# Patient Record
Sex: MALE | Race: WHITE | NOT HISPANIC OR LATINO
[De-identification: names, ages, dates, MRNs, and addresses within clinical notes are randomized per-mention and may not be internally consistent; named-entity substitution may affect disease eponyms.]

---

## 2019-03-25 PROBLEM — Z00.00 ENCOUNTER FOR PREVENTIVE HEALTH EXAMINATION: Status: ACTIVE | Noted: 2019-03-25

## 2019-04-25 ENCOUNTER — APPOINTMENT (OUTPATIENT)
Dept: UROLOGY | Facility: CLINIC | Age: 64
End: 2019-04-25
Payer: MEDICARE

## 2019-04-25 DIAGNOSIS — E78.00 PURE HYPERCHOLESTEROLEMIA, UNSPECIFIED: ICD-10-CM

## 2019-04-25 DIAGNOSIS — Z78.9 OTHER SPECIFIED HEALTH STATUS: ICD-10-CM

## 2019-04-25 DIAGNOSIS — R31.29 OTHER MICROSCOPIC HEMATURIA: ICD-10-CM

## 2019-04-25 PROCEDURE — 99203 OFFICE O/P NEW LOW 30 MIN: CPT

## 2019-04-25 NOTE — ASSESSMENT
[FreeTextEntry1] : SALMA ALDANA is a 64 year old male former smoker who presents with microscopic hematuria\par We discussed AUA microscopic hematuria guidelines w the patient, and work up including cystoscopy and CT is warranted only with 3 or greater of blood cells.\par Plan for repeat urinalysis with urine cytology\par If he does indeed have true microheme we will plan for work up.

## 2019-04-25 NOTE — PHYSICAL EXAM
[General Appearance - Well Developed] : well developed [General Appearance - Well Nourished] : well nourished [Normal Appearance] : normal appearance [Well Groomed] : well groomed [General Appearance - In No Acute Distress] : no acute distress [Edema] : no peripheral edema [Respiration, Rhythm And Depth] : normal respiratory rhythm and effort [Exaggerated Use Of Accessory Muscles For Inspiration] : no accessory muscle use [Abdomen Soft] : soft [Abdomen Tenderness] : non-tender [Costovertebral Angle Tenderness] : no ~M costovertebral angle tenderness [Urethral Meatus] : meatus normal [Urinary Bladder Findings] : the bladder was normal on palpation [Scrotum] : the scrotum was normal [Testes Mass (___cm)] : there were no testicular masses [No Prostate Nodules] : no prostate nodules [Prostate Size ___ gm] : prostate size [unfilled] gm [Normal Station and Gait] : the gait and station were normal for the patient's age [] : no rash [Oriented To Time, Place, And Person] : oriented to person, place, and time [Affect] : the affect was normal [Mood] : the mood was normal [Not Anxious] : not anxious [No Palpable Adenopathy] : no palpable adenopathy

## 2019-04-25 NOTE — LETTER HEADER
[FreeTextEntry3] : Bienvenido Spencer MD\par [facility]imally [facility]vasive Surgery\par \par [facility]\par Division of Urology\par [address]\par [address]\[place] [address]\par Tel [phone]\par Fax [phone]\par \par

## 2019-04-25 NOTE — HISTORY OF PRESENT ILLNESS
[FreeTextEntry1] : SALMA ALDANA is a 64 year old male former smoker who presents with questionable microscopic hematuria. Patient had urine testing done his primary care physician which showed small blood however the rolling 2 red blood cells. There is no pyuria and PSA was 2.09.\par Old records reviewed and PSA is 1.39 January 2017.\par Denies LUTS.\par Denies gross hematuria, dysuria or associated symptoms. \par He does have a remote history of metastatic melanoma which was secured with surgical resection and chemotherapy.\par Denies  PMH including kidney stones, recurrent UTIs. \par Family History: No  malignancies\par \par

## 2019-04-25 NOTE — LETTER BODY
[Dear  ___] : Dear  [unfilled], [Consult Letter:] : I had the pleasure of evaluating your patient, [unfilled]. [Please see my note below.] : Please see my note below. [Consult Closing:] : Thank you very much for allowing me to participate in the care of this patient.  If you have any questions, please do not hesitate to contact me. [Sincerely,] : Sincerely, [FreeTextEntry2] : Callie Nguyen M.D.\par [address] [address]\par  [FreeTextEntry3] : Bienvenido Spencer MD\par [facility] Urolog[facility]imally Invasive Surgery\par [facility]\par Division of Urology\par

## 2019-06-24 ENCOUNTER — INPATIENT (INPATIENT)
Facility: HOSPITAL | Age: 64
LOS: 1 days | Discharge: HOME HEALTH | End: 2019-06-26
Attending: HOSPITALIST | Admitting: HOSPITALIST
Payer: MEDICARE

## 2019-06-24 VITALS
DIASTOLIC BLOOD PRESSURE: 69 MMHG | TEMPERATURE: 99 F | HEART RATE: 96 BPM | RESPIRATION RATE: 20 BRPM | OXYGEN SATURATION: 93 % | SYSTOLIC BLOOD PRESSURE: 130 MMHG

## 2019-06-24 LAB
ANION GAP SERPL CALC-SCNC: 16 MMOL/L — HIGH (ref 7–14)
BASOPHILS # BLD AUTO: 0.03 K/UL — SIGNIFICANT CHANGE UP (ref 0–0.2)
BASOPHILS NFR BLD AUTO: 0.3 % — SIGNIFICANT CHANGE UP (ref 0–1)
BUN SERPL-MCNC: 21 MG/DL — HIGH (ref 10–20)
CALCIUM SERPL-MCNC: 9.3 MG/DL — SIGNIFICANT CHANGE UP (ref 8.5–10.1)
CHLORIDE SERPL-SCNC: 99 MMOL/L — SIGNIFICANT CHANGE UP (ref 98–110)
CO2 SERPL-SCNC: 24 MMOL/L — SIGNIFICANT CHANGE UP (ref 17–32)
CREAT SERPL-MCNC: 0.8 MG/DL — SIGNIFICANT CHANGE UP (ref 0.7–1.5)
EOSINOPHIL # BLD AUTO: 0.03 K/UL — SIGNIFICANT CHANGE UP (ref 0–0.7)
EOSINOPHIL NFR BLD AUTO: 0.3 % — SIGNIFICANT CHANGE UP (ref 0–8)
GLUCOSE SERPL-MCNC: 114 MG/DL — HIGH (ref 70–99)
HCT VFR BLD CALC: 38.9 % — LOW (ref 42–52)
HGB BLD-MCNC: 13.5 G/DL — LOW (ref 14–18)
IMM GRANULOCYTES NFR BLD AUTO: 0.4 % — HIGH (ref 0.1–0.3)
INR BLD: 1.11 RATIO — SIGNIFICANT CHANGE UP (ref 0.65–1.3)
LACTATE SERPL-SCNC: 0.8 MMOL/L — SIGNIFICANT CHANGE UP (ref 0.5–2.2)
LYMPHOCYTES # BLD AUTO: 0.96 K/UL — LOW (ref 1.2–3.4)
LYMPHOCYTES # BLD AUTO: 8.9 % — LOW (ref 20.5–51.1)
MCHC RBC-ENTMCNC: 32.8 PG — HIGH (ref 27–31)
MCHC RBC-ENTMCNC: 34.7 G/DL — SIGNIFICANT CHANGE UP (ref 32–37)
MCV RBC AUTO: 94.4 FL — HIGH (ref 80–94)
MONOCYTES # BLD AUTO: 1.03 K/UL — HIGH (ref 0.1–0.6)
MONOCYTES NFR BLD AUTO: 9.5 % — HIGH (ref 1.7–9.3)
NEUTROPHILS # BLD AUTO: 8.74 K/UL — HIGH (ref 1.4–6.5)
NEUTROPHILS NFR BLD AUTO: 80.6 % — HIGH (ref 42.2–75.2)
NRBC # BLD: 0 /100 WBCS — SIGNIFICANT CHANGE UP (ref 0–0)
PLATELET # BLD AUTO: 141 K/UL — SIGNIFICANT CHANGE UP (ref 130–400)
POTASSIUM SERPL-MCNC: 3.9 MMOL/L — SIGNIFICANT CHANGE UP (ref 3.5–5)
POTASSIUM SERPL-SCNC: 3.9 MMOL/L — SIGNIFICANT CHANGE UP (ref 3.5–5)
PROTHROM AB SERPL-ACNC: 12.7 SEC — SIGNIFICANT CHANGE UP (ref 9.95–12.87)
RBC # BLD: 4.12 M/UL — LOW (ref 4.7–6.1)
RBC # FLD: 13.3 % — SIGNIFICANT CHANGE UP (ref 11.5–14.5)
SODIUM SERPL-SCNC: 139 MMOL/L — SIGNIFICANT CHANGE UP (ref 135–146)
WBC # BLD: 10.83 K/UL — HIGH (ref 4.8–10.8)
WBC # FLD AUTO: 10.83 K/UL — HIGH (ref 4.8–10.8)

## 2019-06-24 PROCEDURE — 74177 CT ABD & PELVIS W/CONTRAST: CPT | Mod: 26

## 2019-06-24 PROCEDURE — 71260 CT THORAX DX C+: CPT | Mod: 26

## 2019-06-24 PROCEDURE — 93010 ELECTROCARDIOGRAM REPORT: CPT

## 2019-06-24 PROCEDURE — 70450 CT HEAD/BRAIN W/O DYE: CPT | Mod: 26

## 2019-06-24 PROCEDURE — 99285 EMERGENCY DEPT VISIT HI MDM: CPT

## 2019-06-24 RX ORDER — AZITHROMYCIN 500 MG/1
500 TABLET, FILM COATED ORAL EVERY 24 HOURS
Refills: 0 | Status: DISCONTINUED | OUTPATIENT
Start: 2019-06-25 | End: 2019-06-25

## 2019-06-24 RX ORDER — CHLORHEXIDINE GLUCONATE 213 G/1000ML
1 SOLUTION TOPICAL
Refills: 0 | Status: DISCONTINUED | OUTPATIENT
Start: 2019-06-24 | End: 2019-06-26

## 2019-06-24 RX ORDER — CEFTRIAXONE 500 MG/1
INJECTION, POWDER, FOR SOLUTION INTRAMUSCULAR; INTRAVENOUS
Refills: 0 | Status: DISCONTINUED | OUTPATIENT
Start: 2019-06-24 | End: 2019-06-26

## 2019-06-24 RX ORDER — SIMVASTATIN 20 MG/1
1 TABLET, FILM COATED ORAL
Qty: 0 | Refills: 0 | DISCHARGE

## 2019-06-24 RX ORDER — MORPHINE SULFATE 50 MG/1
4 CAPSULE, EXTENDED RELEASE ORAL THREE TIMES A DAY
Refills: 0 | Status: DISCONTINUED | OUTPATIENT
Start: 2019-06-24 | End: 2019-06-26

## 2019-06-24 RX ORDER — ENOXAPARIN SODIUM 100 MG/ML
40 INJECTION SUBCUTANEOUS DAILY
Refills: 0 | Status: DISCONTINUED | OUTPATIENT
Start: 2019-06-24 | End: 2019-06-26

## 2019-06-24 RX ORDER — AZITHROMYCIN 500 MG/1
500 TABLET, FILM COATED ORAL ONCE
Refills: 0 | Status: COMPLETED | OUTPATIENT
Start: 2019-06-24 | End: 2019-06-24

## 2019-06-24 RX ORDER — MORPHINE SULFATE 50 MG/1
2 CAPSULE, EXTENDED RELEASE ORAL EVERY 4 HOURS
Refills: 0 | Status: DISCONTINUED | OUTPATIENT
Start: 2019-06-24 | End: 2019-06-24

## 2019-06-24 RX ORDER — PANTOPRAZOLE SODIUM 20 MG/1
40 TABLET, DELAYED RELEASE ORAL
Refills: 0 | Status: DISCONTINUED | OUTPATIENT
Start: 2019-06-24 | End: 2019-06-26

## 2019-06-24 RX ORDER — CEFTRIAXONE 500 MG/1
1000 INJECTION, POWDER, FOR SOLUTION INTRAMUSCULAR; INTRAVENOUS EVERY 24 HOURS
Refills: 0 | Status: DISCONTINUED | OUTPATIENT
Start: 2019-06-25 | End: 2019-06-26

## 2019-06-24 RX ORDER — ACETAMINOPHEN 500 MG
650 TABLET ORAL EVERY 6 HOURS
Refills: 0 | Status: DISCONTINUED | OUTPATIENT
Start: 2019-06-24 | End: 2019-06-26

## 2019-06-24 RX ORDER — CEFTRIAXONE 500 MG/1
1000 INJECTION, POWDER, FOR SOLUTION INTRAMUSCULAR; INTRAVENOUS ONCE
Refills: 0 | Status: COMPLETED | OUTPATIENT
Start: 2019-06-24 | End: 2019-06-24

## 2019-06-24 RX ORDER — AZITHROMYCIN 500 MG/1
TABLET, FILM COATED ORAL
Refills: 0 | Status: DISCONTINUED | OUTPATIENT
Start: 2019-06-24 | End: 2019-06-25

## 2019-06-24 RX ORDER — OXYCODONE AND ACETAMINOPHEN 5; 325 MG/1; MG/1
1 TABLET ORAL EVERY 6 HOURS
Refills: 0 | Status: DISCONTINUED | OUTPATIENT
Start: 2019-06-24 | End: 2019-06-26

## 2019-06-24 RX ORDER — SIMVASTATIN 20 MG/1
40 TABLET, FILM COATED ORAL AT BEDTIME
Refills: 0 | Status: DISCONTINUED | OUTPATIENT
Start: 2019-06-24 | End: 2019-06-26

## 2019-06-24 RX ADMIN — AZITHROMYCIN 255 MILLIGRAM(S): 500 TABLET, FILM COATED ORAL at 13:16

## 2019-06-24 RX ADMIN — SIMVASTATIN 40 MILLIGRAM(S): 20 TABLET, FILM COATED ORAL at 22:38

## 2019-06-24 RX ADMIN — CEFTRIAXONE 100 MILLIGRAM(S): 500 INJECTION, POWDER, FOR SOLUTION INTRAMUSCULAR; INTRAVENOUS at 22:38

## 2019-06-24 RX ADMIN — CEFTRIAXONE 100 MILLIGRAM(S): 500 INJECTION, POWDER, FOR SOLUTION INTRAMUSCULAR; INTRAVENOUS at 12:43

## 2019-06-24 NOTE — ED ADULT NURSE NOTE - NSIMPLEMENTINTERV_GEN_ALL_ED
Implemented All Fall Risk Interventions:  [place] to call system. Call bell, personal items and telephone within reach. Instruct patient to call for assistance. Room bathroom lighting operational. Non-slip footwear when patient is off stretcher. Physically safe environment: no spills, clutter or unnecessary equipment. Stretcher in lowest position, wheels locked, appropriate side rails in place. Provide visual cue, wrist band, yellow gown, etc. Monitor gait and stability. Monitor for mental status changes and reorient to person, place, and time. Review medications for side effects contributing to fall risk. Reinforce activity limits and safety measures with patient and family.

## 2019-06-24 NOTE — ED ADULT NURSE NOTE - CHPI ED NUR SYMPTOMS NEG
no tingling/no numbness/no weakness/no confusion/no vomiting/no bleeding/no deformity/no fever/no loss of consciousness

## 2019-06-24 NOTE — ED PROVIDER NOTE - ATTENDING CONTRIBUTION TO CARE
Seen with PA agree with  above, lungs- rhonchi to both lower bases, abdomen- soft no tenderness to any region, neuro- non focal, CT chest reveal a pneumonia, will need admission for IV antibiotics and further treaments, stable for regular floor admission

## 2019-06-24 NOTE — H&P ADULT - NSHPPHYSICALEXAM_GEN_ALL_CORE
PHYSICAL EXAM:  GEN: No acute distress  HEENT: NCAT   LUNGS: +Rhonchi with left posterior rib tenderness  HEART: S1/S2 present. RRR.   ABD: Soft, non-tender, non-distended. Bowel sounds present  EXT: No pitting edema  NEURO: AAOX3 PHYSICAL EXAM:  GEN: No acute distress  HEENT: NCAT   LUNGS: +Rhonchi on R lower lung, with left posterior rib tenderness  HEART: S1/S2 present. RRR.   ABD: Soft, non-tender, non-distended. Bowel sounds present  EXT: No pitting edema  NEURO: AAOX3

## 2019-06-24 NOTE — H&P ADULT - ASSESSMENT
65 yo M with PMHx of dyslipidemia presents after staining back injury 2/2 mechanical fall. He had slipped on a wet floor in the hotel bathroom on 06/21, hitting both his left lower back and ribs on the toilet. Found to have multifocal pneumonia.    #) Shortness of breath/cough likely 2/2 multifocal pneumonia, possibly 2/2 aspiration   - Lactate 0.8  - Follow-up ID   - Follow-up MRSA PCR, BCx     #) Acute L posterior 10th/11th rib fracture   - Immobilization  - Pain Control: Tylenol 650 mg QD     #) GI ppx: PO Protonix 40 mg QD  #) DVT ppx: Lovenox 40 mg QD     #) Activity: Ambulate as tolerated, fall risk  #) Diet: Regular, aspiration precaution    #) Dispo: From Home  #) Full Code 63 yo M with PMHx of dyslipidemia presents after staining back injury 2/2 mechanical fall. He had slipped on a wet floor in the hotel bathroom on 06/21, hitting both his left lower back and ribs on the toilet. Found to have multifocal pneumonia.    #) Shortness of breath/cough likely 2/2 multifocal pneumonia, ?aspiration  - Lactate 0.8, CT scan showing RML/RLL pneumonia with aspirate in the bronchus. Afebrile so far, denied. No MRSA risk factors; denied aspiration/dysphagia/choking on food   - Start on IV Rocephin 1 g QD and Azithromycin 500 mg QD  - Will need outpatient Pulmonary follow-up     #) Acute L posterior 10th/11th rib fracture   - Immobilization, incentive spirometry   - Pain Control: Tylenol 650 mg QD PRN Mild, Percocet PRN Moderate Q6H, IV Morphine 4 mg PRN Q8H Severe     #) DLD   - Continue with Simvastatin 40 mg QHS     #) GI ppx: PO Protonix 40 mg QD  #) DVT ppx: Lovenox 40 mg QD     #) Activity: Ambulate as tolerated, fall risk  #) Diet: Regular, aspiration precaution, HOB 35-45    #) Dispo: From Home  #) Full Code    #) Pharmacy: [facility]

## 2019-06-24 NOTE — ED PROVIDER NOTE - PROGRESS NOTE DETAILS
Case discussed with Surgery resident 2 rib fractures- states can be admitted to medicine for pneumonia.

## 2019-06-24 NOTE — H&P ADULT - NSHPLABSRESULTS_GEN_ALL_CORE
LABS:                        13.5   10.83 )-----------( 141      ( 24 Jun 2019 09:23 )             38.9     06-24    139  |  99  |  21<H>  ----------------------------<  114<H>  3.9   |  24  |  0.8    Ca    9.3      24 Jun 2019 09:23      PT/INR - ( 24 Jun 2019 09:23 )   PT: 12.70 sec;   INR: 1.11 ratio               Lactate, Blood: 0.8 mmol/L (06-24-19 @ 12:07)          Mild compression deformity at L4 which is likely chronic in nature.   Clinical correlation is advised.    Multifocal airspace opacification the right middle lobe and right lower   lobe. Debris in bronchus intermedius and right lower lobe bronchus   consistent with aspirated debris. Findings are consistent with aspiration   pneumonia in the appropriate clinical context.    < end of copied text >    < from: CT Abdomen and Pelvis w/ IV Cont (06.24.19 @ 11:08) >    there are nondisplaced acute left posterior 10th and   11th rib fractures.    < end of copied text >

## 2019-06-24 NOTE — ED PROVIDER NOTE - CARE PLAN
Principal Discharge DX:	Infiltrate of lung present on computed tomography  Secondary Diagnosis:	Closed fracture of multiple ribs of left side, initial encounter  Secondary Diagnosis:	Hypoxia

## 2019-06-24 NOTE — ED PROVIDER NOTE - OBJECTIVE STATEMENT
63 y/o male with hx HDL presents to the ED c/o " I slipped on wet floor in hotel bathroom on Friday hitting my left lower back/ ribs on toliet. I have pain with breathing." no head trauma/ LOC/ neck pain 65 y/o male with hx HDL presents to the ED c/o " I slipped on wet floor in hotel bathroom on Friday hitting my left lower back/ ribs on toliet. I have pain with breathing." no head trauma/ LOC/ neck pain. Patient also c/o productive cough and chills for a couple days. Wife with similar symptoms.

## 2019-06-24 NOTE — H&P ADULT - HISTORY OF PRESENT ILLNESS
63 yo M with PMHx of dyslipidemia presents after staining back injury 2/2 mechanical fall. He had slipped on a wet floor in the hotel bathroom on 06/21, hitting both his left lower back and ribs on the toilet. He has pain with breathing. Denied head trauma, loss of consciousness or neck pain. He also complained of productive cough and chills for the past couple days. His wife also has similar symptoms.     EKG was showing normal sinus rhythm. Trauma work-up yielded closed fracture of 10th and 11th left-sided ribs with RML/RLL pulmonary infiltrate with findings consistent with aspiration. 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 He had slipped on a wet floor in the hotel bathroom on 06/21, hitting both his left lower back and ribs on the toilet. He has pain with breathing. Denied head trauma, loss of consciousness or neck pain. He also complained of productive cough of clear sputum and chills for 3 days. His wife also has similar symptoms. Denied choking/dysphagia/coughing with food/recent use of antibiotics.     EKG was showing normal sinus rhythm. Trauma work-up yielded closed fracture of 10th and 11th left-sided ribs with RML/RLL pulmonary infiltrate with findings consistent with aspiration. 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 He had slipped on a wet floor in the hotel bathroom on 06/21, hitting both his left lower back and ribs on the toilet. He denied feeling weak or lightheaded prior to fall. He has pain with breathing. Denied head trauma, loss of consciousness or neck pain. He also complained of productive cough of clear sputum and chills for 3 days. His wife also has similar symptoms. Denied choking/dysphagia/coughing with food/recent use of antibiotics.     EKG was showing normal sinus rhythm. Trauma work-up yielded closed fracture of 10th and 11th left-sided ribs with RML/RLL pulmonary infiltrate with findings consistent with aspiration.

## 2019-06-24 NOTE — H&P ADULT - ATTENDING COMMENTS
63 yo M with PMHx of dyslipidemia presents after sustaining back injury 2/2 mechanical fall. He had slipped on a wet floor in the hotel bathroom on 06/21, hitting both his left lower back and ribs on the toilet. Found to have RLL/RML pna    #) Shortness of breath/cough 2/2 RLL/RML aspiration PNA  - Lactate 0.8, CT scan showing RML/RLL pneumonia with aspirate in the bronchus. Afebrile so far, denied. No MRSA risk factors; denied aspiration/dysphagia/choking on food   - Start on IV Rocephin 1 g QD and Azithromycin 500 mg QD  -Change to CTX, flagyl.  Overall improving.  ST eval ruled out any undiagnosed aspiration.  May be discharged tomorrow on PO Antibiotics.    #) Acute L posterior 10th/11th rib fracture   - Immobilization, incentive spirometry   - Pain Control: Tylenol 650 mg QD PRN Mild, Percocet PRN Moderate Q6H, IV Morphine 4 mg PRN Q8H Severe   No neurocardiogenic etiology for his fall. PT cleared for home.     #) DLD   - Continue with Simvastatin 40 mg QHS     #) GI ppx: PO Protonix 40 mg QD  #) DVT ppx: Lovenox 40 mg QD     #) Activity: Ambulate as tolerated, fall risk  #) Diet: Regular, aspiration precaution, HOB 35-45    #) Dispo: From Home  #) Full Code    #) [facility]acy: [facility]     ANTICIPATE FOR D/c 6/26 ON PO Antibiotics

## 2019-06-24 NOTE — ED ADULT NURSE NOTE - OBJECTIVE STATEMENT
pt s/p fall at home on wet floor. denies head trauma, loc. no blood thinners. c/o left rib cage pain.

## 2019-06-25 LAB
ANION GAP SERPL CALC-SCNC: 13 MMOL/L — SIGNIFICANT CHANGE UP (ref 7–14)
BASOPHILS # BLD AUTO: 0.02 K/UL — SIGNIFICANT CHANGE UP (ref 0–0.2)
BASOPHILS NFR BLD AUTO: 0.2 % — SIGNIFICANT CHANGE UP (ref 0–1)
BUN SERPL-MCNC: 17 MG/DL — SIGNIFICANT CHANGE UP (ref 10–20)
CALCIUM SERPL-MCNC: 8.3 MG/DL — LOW (ref 8.5–10.1)
CHLORIDE SERPL-SCNC: 99 MMOL/L — SIGNIFICANT CHANGE UP (ref 98–110)
CK SERPL-CCNC: 66 U/L — SIGNIFICANT CHANGE UP (ref 0–225)
CO2 SERPL-SCNC: 26 MMOL/L — SIGNIFICANT CHANGE UP (ref 17–32)
CREAT SERPL-MCNC: 0.7 MG/DL — SIGNIFICANT CHANGE UP (ref 0.7–1.5)
EOSINOPHIL # BLD AUTO: 0.14 K/UL — SIGNIFICANT CHANGE UP (ref 0–0.7)
EOSINOPHIL NFR BLD AUTO: 1.4 % — SIGNIFICANT CHANGE UP (ref 0–8)
GLUCOSE SERPL-MCNC: 97 MG/DL — SIGNIFICANT CHANGE UP (ref 70–99)
HCT VFR BLD CALC: 37.4 % — LOW (ref 42–52)
HCV AB S/CO SERPL IA: 0.38 S/CO — SIGNIFICANT CHANGE UP (ref 0–0.99)
HCV AB SERPL-IMP: SIGNIFICANT CHANGE UP
HGB BLD-MCNC: 12.6 G/DL — LOW (ref 14–18)
IMM GRANULOCYTES NFR BLD AUTO: 0.3 % — SIGNIFICANT CHANGE UP (ref 0.1–0.3)
LYMPHOCYTES # BLD AUTO: 1.15 K/UL — LOW (ref 1.2–3.4)
LYMPHOCYTES # BLD AUTO: 11.5 % — LOW (ref 20.5–51.1)
MCHC RBC-ENTMCNC: 32.1 PG — HIGH (ref 27–31)
MCHC RBC-ENTMCNC: 33.7 G/DL — SIGNIFICANT CHANGE UP (ref 32–37)
MCV RBC AUTO: 95.4 FL — HIGH (ref 80–94)
MONOCYTES # BLD AUTO: 0.98 K/UL — HIGH (ref 0.1–0.6)
MONOCYTES NFR BLD AUTO: 9.8 % — HIGH (ref 1.7–9.3)
NEUTROPHILS # BLD AUTO: 7.7 K/UL — HIGH (ref 1.4–6.5)
NEUTROPHILS NFR BLD AUTO: 76.8 % — HIGH (ref 42.2–75.2)
NRBC # BLD: 0 /100 WBCS — SIGNIFICANT CHANGE UP (ref 0–0)
PLATELET # BLD AUTO: 139 K/UL — SIGNIFICANT CHANGE UP (ref 130–400)
POTASSIUM SERPL-MCNC: 4 MMOL/L — SIGNIFICANT CHANGE UP (ref 3.5–5)
POTASSIUM SERPL-SCNC: 4 MMOL/L — SIGNIFICANT CHANGE UP (ref 3.5–5)
RBC # BLD: 3.92 M/UL — LOW (ref 4.7–6.1)
RBC # FLD: 13.4 % — SIGNIFICANT CHANGE UP (ref 11.5–14.5)
SODIUM SERPL-SCNC: 138 MMOL/L — SIGNIFICANT CHANGE UP (ref 135–146)
TROPONIN T SERPL-MCNC: <0.01 NG/ML — SIGNIFICANT CHANGE UP
WBC # BLD: 10.02 K/UL — SIGNIFICANT CHANGE UP (ref 4.8–10.8)
WBC # FLD AUTO: 10.02 K/UL — SIGNIFICANT CHANGE UP (ref 4.8–10.8)

## 2019-06-25 PROCEDURE — 99222 1ST HOSP IP/OBS MODERATE 55: CPT

## 2019-06-25 RX ORDER — METRONIDAZOLE 500 MG
500 TABLET ORAL EVERY 8 HOURS
Refills: 0 | Status: DISCONTINUED | OUTPATIENT
Start: 2019-06-25 | End: 2019-06-26

## 2019-06-25 RX ORDER — METRONIDAZOLE 500 MG
TABLET ORAL
Refills: 0 | Status: DISCONTINUED | OUTPATIENT
Start: 2019-06-25 | End: 2019-06-26

## 2019-06-25 RX ORDER — METRONIDAZOLE 500 MG
500 TABLET ORAL ONCE
Refills: 0 | Status: COMPLETED | OUTPATIENT
Start: 2019-06-25 | End: 2019-06-25

## 2019-06-25 RX ORDER — LIDOCAINE 4 G/100G
1 CREAM TOPICAL ONCE
Refills: 0 | Status: COMPLETED | OUTPATIENT
Start: 2019-06-25 | End: 2019-06-25

## 2019-06-25 RX ADMIN — PANTOPRAZOLE SODIUM 40 MILLIGRAM(S): 20 TABLET, DELAYED RELEASE ORAL at 05:09

## 2019-06-25 RX ADMIN — Medication 100 MILLIGRAM(S): at 21:10

## 2019-06-25 RX ADMIN — LIDOCAINE 1 PATCH: 4 CREAM TOPICAL at 20:57

## 2019-06-25 RX ADMIN — LIDOCAINE 1 PATCH: 4 CREAM TOPICAL at 18:10

## 2019-06-25 RX ADMIN — Medication 100 MILLIGRAM(S): at 11:20

## 2019-06-25 RX ADMIN — ENOXAPARIN SODIUM 40 MILLIGRAM(S): 100 INJECTION SUBCUTANEOUS at 14:05

## 2019-06-25 RX ADMIN — Medication 650 MILLIGRAM(S): at 18:13

## 2019-06-25 RX ADMIN — CEFTRIAXONE 100 MILLIGRAM(S): 500 INJECTION, POWDER, FOR SOLUTION INTRAMUSCULAR; INTRAVENOUS at 15:56

## 2019-06-25 RX ADMIN — SIMVASTATIN 40 MILLIGRAM(S): 20 TABLET, FILM COATED ORAL at 21:10

## 2019-06-25 NOTE — SWALLOW BEDSIDE ASSESSMENT ADULT - COMMENTS
+toleration w/o overt s/s penetration/aspiration w/ puree. +toleration w/o overt s/s penetration/aspiration w/ solids

## 2019-06-25 NOTE — PROGRESS NOTE ADULT - SUBJECTIVE AND OBJECTIVE BOX
HPI:  65 yo Kenyan speaking M w/ PMH of DLD, and 18 pack year smoker (quit 11 years ago) presented with CC of sustaining back injury after slipping on a wet floor in a hotel bathroom on 6/21, where he hit his left lower back and ribs against a toilet. Denied weakness or light headedness prior to fall. He denied hitting his head or losing consciousness or any neck pain. Additionally complained of chills and productive cough of clear sputum for 3 days with wife having similar symptoms. Trauma w/u showed closed fracture of 10th/11th left sided ribs with RML/RLL infiltrate consistent with aspiration (6/24).    INTERVAL HPI:  Patient was seen at bedside this morning. Patient is resting comfortably in bed on room air. Mentioned normal BM this morning and is urinating well. Patient does not have any headache, nausea or vomiting. Complains of pain in the region of his left 10/11 ribs, especially when coughing. Patient also reports coughing up brownish phelgm.    ROS: Otherwise unremarkable    Vital Signs Last 24 Hrs  T(C): 37.7 (25 Jun 2019 06:17), Max: 38 (24 Jun 2019 21:40)  T(F): 99.9 (25 Jun 2019 06:17), Max: 100.4 (24 Jun 2019 21:40)  HR: 81 (25 Jun 2019 06:17) (77 - 86)  BP: 145/74 (25 Jun 2019 06:17) (117/61 - 145/74)  BP(mean): --  RR: 17 (25 Jun 2019 06:17) (16 - 18)  SpO2: 91% (25 Jun 2019 08:01) (91% - 97%)    MEDICATIONS  (STANDING):  cefTRIAXone   IVPB 1000 milliGRAM(s) IV Intermittent every 24 hours  cefTRIAXone   IVPB      chlorhexidine 4% Liquid 1 Application(s) Topical <User Schedule>  enoxaparin Injectable 40 milliGRAM(s) SubCutaneous daily  metroNIDAZOLE  IVPB 500 milliGRAM(s) IV Intermittent every 8 hours  metroNIDAZOLE  IVPB      pantoprazole    Tablet 40 milliGRAM(s) Oral before breakfast  simvastatin 40 milliGRAM(s) Oral at bedtime    MEDICATIONS  (PRN):  acetaminophen   Tablet .. 650 milliGRAM(s) Oral every 6 hours PRN Temp greater or equal to 38C (100.4F), Mild Pain (1 - 3)  morphine  - Injectable 4 milliGRAM(s) IV Push three times a day PRN Severe Pain (7 - 10)  oxyCODONE    5 mG/acetaminophen 325 mG 1 Tablet(s) Oral every 6 hours PRN Moderate Pain (4 - 6)    RADIOLOGY:  < from: CT Chest w/ IV Cont (06.24.19 @ 11:08) >  IMPRESSION:     No acute traumatic abnormalitywithin the chest abdomen and pelvis.    Mild compression deformity at L4 which is likely chronic in nature.   Clinical correlation is advised.    Multifocal airspace opacification the right middle lobe and right lower   lobe. Debris in bronchus intermedius and right lower lobe bronchus   consistent with aspirated debris. Findings are consistent with aspiration   pneumonia in the appropriate clinical context.        ***Please see the addendum at the top of this report. It may contain   additional important information or changes.****          LISET DANIEL M.D., ATTENDING RADIOLOGIST  This document has been electronically signed. Jun 24 2019 11:31AM  Addend:  LISET DANIEL M.D., ATTENDING RADIOLOGIST  This addendum was electronically signed on: Jun 24 2019 11:38AM.    PHYSICAL EXAM:  Gen: Patient is lying comfortably in bed. In moderate distress due to pain on 10/11 left ribs. Appears stated age.   Cardio: S1/S2, no murmurs, regular rate and rhythm   Pulmonary: Limited air entry due to associated pain. CLA B/L, no wheezes  GI: Soft, NT/ND, no guarding  HEENT: Normocephalic, atraumatic    LABS:                        12.6   10.02 )-----------( 139      ( 25 Jun 2019 06:19 )             37.4     06-25    138  |  99  |  17  ----------------------------<  97  4.0   |  26  |  0.7    Ca    8.3<L>      25 Jun 2019 06:19

## 2019-06-25 NOTE — PHYSICAL THERAPY INITIAL EVALUATION ADULT - PERTINENT HX OF CURRENT PROBLEM, REHAB EVAL
Pt adm with closed fx of 10th/11th L sided ribs with RML/RLL pulmonary infiltrate  due to fall and slip in bathroom on 6/21

## 2019-06-25 NOTE — SWALLOW BEDSIDE ASSESSMENT ADULT - SLP PERTINENT HISTORY OF CURRENT PROBLEM
Pt presented w/ productive cough x3 days w/ clear sputum following mechanical fall; PMHx: DLD, ex-smoker, 10th/11th rib fractures, compression deformities at C4 (chronic). CT chest (+) airspace opacities RML & RLL, debris in bronchus, ?aspiration.

## 2019-06-25 NOTE — PHYSICAL THERAPY INITIAL EVALUATION ADULT - GENERAL OBSERVATIONS, REHAB EVAL
Pt was seen from 9:05-9:30 for a total of 25 min. Pt encountered sitting EOB, -bed alarm, No apparent distress. Pt agreeable to PT.

## 2019-06-25 NOTE — PROGRESS NOTE ADULT - ASSESSMENT
Assessment:  63 yo Ivorian speaking M w/ PMH of DLD, and 18 pack year smoker (quit 11 years ago) presented with CC of sustaining back injury after slipping on a wet floor in a hotel bathroom on 6/21, where he hit his left lower back and ribs against a toilet. Denied weakness or light headedness prior to fall. He denied hitting his head or losing consciousness or any neck pain. Additionally complained of chills and productive cough of clear sputum for 3 days with wife having similar symptoms. Trauma w/u showed closed fracture of 10th/11th left sided ribs with RML/RLL infiltrate consistent with aspiration (6/24). Assessment:  63 yo Honduran speaking M w/ PMH of DLD, and 18 pack year smoker (quit 11 years ago) presented with CC of sustaining back injury after slipping on a wet floor in a hotel bathroom on 6/21, where he hit his left lower back and ribs against a toilet. Denied weakness or light headedness prior to fall. He denied hitting his head or losing consciousness or any neck pain. Additionally complained of chills and productive cough of clear sputum for 3 days with wife having similar symptoms. Trauma w/u showed closed fracture of 10th/11th left sided ribs with RML/RLL infiltrate consistent with aspiration (6/24). Assessment:  63 yo Solomon Islander speaking M w/ PMH of DLD, and 18 pack year smoker (quit 11 years ago) presented with CC of sustaining back injury after slipping on a wet floor in a hotel bathroom on 6/21, where he hit his left lower back and ribs against a toilet. Denied weakness or light headedness prior to fall. He denied hitting his head or losing consciousness or any neck pain. Additionally complained of chills and productive cough of clear sputum for 3 days with wife having similar symptoms. Trauma w/u showed closed fracture of 10th/11th left sided ribs with RML/RLL infiltrate consistent with aspiration (6/24).    #Aspiration PNA  - Flagyl + Ceftriaxone  - Assessment:  65 yo [place] speaking M w/ PMH of DLD, and 18 pack year smoker (quit 11 years ago) presented with CC of sustaining back injury after slipping on a wet floor in a hotel bathroom on 6/21, where he hit his left lower back and ribs against a toilet. Denied weakness or light headedness prior to fall. He denied hitting his head or losing consciousness or any neck pain. Additionally complained of chills and productive cough of clear sputum for 3 days with wife having similar symptoms. Trauma w/u showed closed fracture of 10th/11th left sided ribs with RML/RLL infiltrate consistent with aspiration (6/24).    #Aspiration PNA  - Flagyl + Ceftriaxone  - Activity, as tolerated  - Incentive spirometry    #Left Rib 10/11 fractures  - Pain c/w tylenol & morphine PRN    #DVT ppx  - Lovenox     #GI ppx  - Pantoprazole    #DLD  - Simvastatin    Regular diet, OOTBC

## 2019-06-25 NOTE — PHYSICAL THERAPY INITIAL EVALUATION ADULT - CRITERIA FOR SKILLED THERAPEUTIC INTERVENTIONS
anticipated discharge recommendation/Pt performs all functional tasks with supervision and Acute skilled PT is not indicated at this time. Pt has no concerns for return to home at this time.

## 2019-06-25 NOTE — CONSULT NOTE ADULT - SUBJECTIVE AND OBJECTIVE BOX
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 He had slipped on a wet floor in the hotel bathroom on 06/21, hitting both his left lower back and ribs on the toilet. He denied feeling weak or lightheaded prior to fall. He has pain with breathing. Denied head trauma, loss of consciousness or neck pain. He also complained of productive cough of clear sputum and chills for 3 days. His wife also has similar symptoms. Denied choking/dysphagia/coughing with food/recent use of antibiotics.     EKG was showing normal sinus rhythm. Trauma work-up yielded closed fracture of 10th and 11th left-sided ribs with RML/RLL pulmonary infiltrate with findings consistent with aspiration. (24 Jun 2019 14:45)    PAST MEDICAL & SURGICAL HISTORY:  High cholesterol    Home Medications:  simvastatin 40 mg oral tablet: 1 tab(s) orally once a day (at bedtime) (24 Jun 2019 16:33)    VITALS:  T(F): 100.4 (06-24-19 @ 21:40), Max: 100.4 (06-24-19 @ 21:40)  HR: 83 (06-24-19 @ 21:40) (77 - 96)  BP: 117/61 (06-24-19 @ 21:40) (117/61 - 130/69)  RR: 16 (06-24-19 @ 21:40) (16 - 20)  SpO2: 94% (06-24-19 @ 21:40) (93% - 97%)    PHYSICAL EXAM:  General: NAD, calm and cooperative  Cardiac: RRR S1, S2  Chestwall: mild left sided chest wall tenderness  Respiratory: CTAB, normal respiratory effort, breath sounds equal BL, no wheeze, rhonchi or crackles  Abdomen: Soft, non-distended, non-tender, no rebound, no guarding. +BS.    MEDICATIONS  (STANDING):  azithromycin  IVPB 500 milliGRAM(s) IV Intermittent every 24 hours  azithromycin  IVPB      cefTRIAXone   IVPB 1000 milliGRAM(s) IV Intermittent every 24 hours  cefTRIAXone   IVPB      chlorhexidine 4% Liquid 1 Application(s) Topical <User Schedule>  enoxaparin Injectable 40 milliGRAM(s) SubCutaneous daily  pantoprazole    Tablet 40 milliGRAM(s) Oral before breakfast  simvastatin 40 milliGRAM(s) Oral at bedtime    MEDICATIONS  (PRN):  acetaminophen   Tablet .. 650 milliGRAM(s) Oral every 6 hours PRN Temp greater or equal to 38C (100.4F), Mild Pain (1 - 3)  morphine  - Injectable 4 milliGRAM(s) IV Push three times a day PRN Severe Pain (7 - 10)  oxyCODONE    5 mG/acetaminophen 325 mG 1 Tablet(s) Oral every 6 hours PRN Moderate Pain (4 - 6)      LAB/STUDIES:                        13.5   10.83 )-----------( 141      ( 24 Jun 2019 09:23 )             38.9     06-24    139  |  99  |  21<H>  ----------------------------<  114<H>  3.9   |  24  |  0.8    Ca    9.3      24 Jun 2019 09:23      PT/INR - ( 24 Jun 2019 09:23 )   PT: 12.70 sec;   INR: 1.11 ratio        CT Abdomen and Pelvis w/ IV Cont:   EXAM:  CT ABDOMEN AND PELVIS IC          *** ADDENDUM 06/24/2019  ***    Upon further review there are nondisplaced acute left posterior 10th and 11th rib fractures.    IMPRESSION:     No acute traumatic abnormalitywithin the chest abdomen and pelvis.    Mild compression deformity at L4 which is likely chronic in nature.   Clinical correlation is advised.    Multifocal airspace opacification the right middle lobe and right lower   lobe. Debris in bronchus intermedius and right lower lobe bronchus   consistent with aspirated debris. Findings are consistent with aspiration   pneumonia in the appropriate clinical context.    CT Head No Cont:   Impression:    No mass effect or intracranial hemorrhage.  Air-fluid level in the right maxillary sinus.    ASSESSMENT:  64yM w/ PMHx of HLD who presented with productive cough/chills x 2 days .Trauma work-up w/ closed fracture of 10th and 11th left-sided ribs with RML/RLL pulmonary infiltrate with findings consistent with aspiration, WBC 10.83    PLAN:  - Medical admission for PNA  - Incentive spirometry 750cc    Above plan discussed with Dr. Morales

## 2019-06-25 NOTE — SWALLOW BEDSIDE ASSESSMENT ADULT - SWALLOW EVAL: DIAGNOSIS
+toleration for thin liquids, puree and regular consistency solids w/o overt s/s penetration/aspiration.

## 2019-06-25 NOTE — PHYSICAL THERAPY INITIAL EVALUATION ADULT - SPECIFY REASON(S)
Pt performs all functional tasks with supervision and Acute skilled PT is not indicated at this time. Pt has no concerns for return to home at this time.

## 2019-06-26 ENCOUNTER — TRANSCRIPTION ENCOUNTER (OUTPATIENT)
Age: 64
End: 2019-06-26

## 2019-06-26 VITALS
TEMPERATURE: 98 F | SYSTOLIC BLOOD PRESSURE: 109 MMHG | DIASTOLIC BLOOD PRESSURE: 67 MMHG | HEART RATE: 78 BPM | RESPIRATION RATE: 18 BRPM

## 2019-06-26 LAB
ALBUMIN SERPL ELPH-MCNC: 3.3 G/DL — LOW (ref 3.5–5.2)
ALP SERPL-CCNC: 48 U/L — SIGNIFICANT CHANGE UP (ref 30–115)
ALT FLD-CCNC: 16 U/L — SIGNIFICANT CHANGE UP (ref 0–41)
ANION GAP SERPL CALC-SCNC: 11 MMOL/L — SIGNIFICANT CHANGE UP (ref 7–14)
AST SERPL-CCNC: 16 U/L — SIGNIFICANT CHANGE UP (ref 0–41)
BILIRUB SERPL-MCNC: 0.3 MG/DL — SIGNIFICANT CHANGE UP (ref 0.2–1.2)
BUN SERPL-MCNC: 16 MG/DL — SIGNIFICANT CHANGE UP (ref 10–20)
CALCIUM SERPL-MCNC: 8.2 MG/DL — LOW (ref 8.5–10.1)
CHLORIDE SERPL-SCNC: 103 MMOL/L — SIGNIFICANT CHANGE UP (ref 98–110)
CO2 SERPL-SCNC: 26 MMOL/L — SIGNIFICANT CHANGE UP (ref 17–32)
CREAT SERPL-MCNC: 0.7 MG/DL — SIGNIFICANT CHANGE UP (ref 0.7–1.5)
GLUCOSE SERPL-MCNC: 118 MG/DL — HIGH (ref 70–99)
HCT VFR BLD CALC: 34.6 % — LOW (ref 42–52)
HGB BLD-MCNC: 11.7 G/DL — LOW (ref 14–18)
MCHC RBC-ENTMCNC: 32.1 PG — HIGH (ref 27–31)
MCHC RBC-ENTMCNC: 33.8 G/DL — SIGNIFICANT CHANGE UP (ref 32–37)
MCV RBC AUTO: 95.1 FL — HIGH (ref 80–94)
NRBC # BLD: 0 /100 WBCS — SIGNIFICANT CHANGE UP (ref 0–0)
PLATELET # BLD AUTO: 144 K/UL — SIGNIFICANT CHANGE UP (ref 130–400)
POTASSIUM SERPL-MCNC: 3.9 MMOL/L — SIGNIFICANT CHANGE UP (ref 3.5–5)
POTASSIUM SERPL-SCNC: 3.9 MMOL/L — SIGNIFICANT CHANGE UP (ref 3.5–5)
PROT SERPL-MCNC: 5.6 G/DL — LOW (ref 6–8)
RBC # BLD: 3.64 M/UL — LOW (ref 4.7–6.1)
RBC # FLD: 13.2 % — SIGNIFICANT CHANGE UP (ref 11.5–14.5)
SODIUM SERPL-SCNC: 140 MMOL/L — SIGNIFICANT CHANGE UP (ref 135–146)
WBC # BLD: 7.92 K/UL — SIGNIFICANT CHANGE UP (ref 4.8–10.8)
WBC # FLD AUTO: 7.92 K/UL — SIGNIFICANT CHANGE UP (ref 4.8–10.8)

## 2019-06-26 PROCEDURE — 99231 SBSQ HOSP IP/OBS SF/LOW 25: CPT

## 2019-06-26 PROCEDURE — 99239 HOSP IP/OBS DSCHRG MGMT >30: CPT

## 2019-06-26 RX ORDER — IBUPROFEN 200 MG
600 TABLET ORAL ONCE
Refills: 0 | Status: COMPLETED | OUTPATIENT
Start: 2019-06-26 | End: 2019-06-26

## 2019-06-26 RX ORDER — ACETAMINOPHEN 500 MG
2 TABLET ORAL
Qty: 56 | Refills: 0
Start: 2019-06-26 | End: 2019-07-02

## 2019-06-26 RX ORDER — METRONIDAZOLE 500 MG
500 TABLET ORAL EVERY 8 HOURS
Refills: 0 | Status: DISCONTINUED | OUTPATIENT
Start: 2019-06-26 | End: 2019-06-26

## 2019-06-26 RX ORDER — GUAIFENESIN/DEXTROMETHORPHAN 600MG-30MG
10 TABLET, EXTENDED RELEASE 12 HR ORAL EVERY 6 HOURS
Refills: 0 | Status: DISCONTINUED | OUTPATIENT
Start: 2019-06-26 | End: 2019-06-26

## 2019-06-26 RX ORDER — GUAIFENESIN/DEXTROMETHORPHAN 600MG-30MG
10 TABLET, EXTENDED RELEASE 12 HR ORAL
Qty: 200 | Refills: 0
Start: 2019-06-26 | End: 2019-06-30

## 2019-06-26 RX ORDER — LIDOCAINE 4 G/100G
1 CREAM TOPICAL DAILY
Refills: 0 | Status: DISCONTINUED | OUTPATIENT
Start: 2019-06-26 | End: 2019-06-26

## 2019-06-26 RX ADMIN — Medication 500 MILLIGRAM(S): at 13:37

## 2019-06-26 RX ADMIN — Medication 100 MILLIGRAM(S): at 05:08

## 2019-06-26 RX ADMIN — Medication 600 MILLIGRAM(S): at 10:58

## 2019-06-26 RX ADMIN — PANTOPRAZOLE SODIUM 40 MILLIGRAM(S): 20 TABLET, DELAYED RELEASE ORAL at 05:10

## 2019-06-26 RX ADMIN — Medication 10 MILLILITER(S): at 11:35

## 2019-06-26 RX ADMIN — LIDOCAINE 1 PATCH: 4 CREAM TOPICAL at 11:34

## 2019-06-26 RX ADMIN — OXYCODONE AND ACETAMINOPHEN 1 TABLET(S): 5; 325 TABLET ORAL at 05:53

## 2019-06-26 RX ADMIN — Medication 600 MILLIGRAM(S): at 13:37

## 2019-06-26 RX ADMIN — OXYCODONE AND ACETAMINOPHEN 1 TABLET(S): 5; 325 TABLET ORAL at 05:10

## 2019-06-26 NOTE — DISCHARGE NOTE PROVIDER - HOSPITAL COURSE
63 yo Egyptian speaking M w/ PMH of Dyslipidemia, and 18 pack year smoker (quit 11 years ago) presented with complain  of sustaining back injury after slipping on a wet floor in a hotel bathroom on 6/21, where he hit his left lower back and ribs against a toilet        Found to have left rib 10 and 11 fracture, was also found to have opacity on right lung likely pneumonia secondary to aspiration.    Patient was admitted under medicine, given iv flagyl and ceftriaxone.    for rib fracture patient was advised for incentive spirometry and pain control.        Patient feels better today, pain controlled, independent with activities.    will discharge home on po abx and cough syrup.

## 2019-06-26 NOTE — DISCHARGE NOTE NURSING/CASE MANAGEMENT/SOCIAL WORK - NSDCDPATPORTLINK_GEN_ALL_CORE
You can access the [facility] Patient Portal, offered by [facility], by registering with the following website: [email or web address]

## 2019-06-26 NOTE — DISCHARGE NOTE PROVIDER - NSDCCPCAREPLAN_GEN_ALL_CORE_FT
PRINCIPAL DISCHARGE DIAGNOSIS  Diagnosis: Infiltrate of lung present on computed tomography  Assessment and Plan of Treatment: You had infection in your lung for which you were treated with antibiotics.  please complete the course and repeat chest xray PA and lateral view and follow up with your primary physician in 2 weeks.        SECONDARY DISCHARGE DIAGNOSES  Diagnosis: Closed fracture of multiple ribs of left side, initial encounter  Assessment and Plan of Treatment: please take tylenol for pain control  please follow up with primary physician in 2 weeks.

## 2019-06-26 NOTE — CHART NOTE - NSCHARTNOTEFT_GEN_A_CORE
Tertiary Survey    Patient seen and examined. No complaints at this time.     PHYSICAL EXAM:  Craniofacial: Atraumatic, No deformity  Eyes: Pupil Size equal B/L and RTL. EOMI  Oropharynx: Atraumatic  Neck: Non-tender, No deformity, Trachea midline.  Chest: Equal breath sounds, non-tender, No deformity or crepitus  Heart: RSR, No murmurs, no rubs  Abdomen: Atraumatic, Non-tender, non-distended. No hepatosplenomegaly  Pelvis: Stable, non tender, no deformity  : Atraumatic, no blood at the meatus or priapism  Back: Spine nontender. atraumatic  Extremities: Atraumatic, no deformities, normal pulses  Neurologic: CN intact, Motor intact throughout. Sensation intact/normal throughout.  Psych: Mood and affect normal. Judgment and insight normal    All images/reports reviewed. No further traumatic work-up warranted.    Denies pain  No further imaging or trauma workup at this time  Continue medical management

## 2019-06-26 NOTE — PROGRESS NOTE ADULT - SUBJECTIVE AND OBJECTIVE BOX
[facility]            Patient was evaluated and examined  by bedside, c/o mild left sided chest pain during cough, no dyspnea at rest, no hypoxia, no fevers          REVIEW OF SYSTEMS:  please see pertinent positives mentioned above, all other 12 ROS negative      T(C): , Max: 37.4 (06-25-19 @ 14:20)  HR: 79 (06-26-19 @ 06:06)  BP: 129/72 (06-26-19 @ 06:06)  RR: 17 (06-26-19 @ 06:06)  SpO2: 93% (06-26-19 @ 07:37)  CAPILLARY BLOOD GLUCOSE          PHYSICAL EXAM:  General: NAD, AAOX3, patient is sitting comfortably in bed  HEENT: AT, NC, Supple, NO JVD, NO CB  Lungs: mild decreased breath sounds over right lung zone area, good air entry over left side, no wheezing, no rhonchi  CVS: normal S1, S2, RRR, NO M/G/R  Abdomen: soft, bowel sounds present, non-tender, non-distended  Extremities: no edema, no clubbing, no cyanosis, positive peripheral pulses b/l  Neuro: no acute focal neurological deficits  Skin: no rush, no ecchymosis      LAB  CBC  Date: 06-26-19 @ 06:30  Mean cell Fcxohwpgfv12.1  Mean cell Hemoglobin Conc33.8  Mean cell Volum 95.1  Platelet count-Automate 144  RBC Count 3.64  Red Cell Distrib Width13.2  WBC Count7.92  % Albumin, Urine--  Hematocrit 34.6  Hemoglobin 11.7  CBC  Date: 06-25-19 @ 06:19  Mean cell Ijpixvhlie51.1  Mean cell Hemoglobin Conc33.7  Mean cell Volum 95.4  Platelet count-Automate 139  RBC Count 3.92  Red Cell Distrib Width13.4  WBC Count10.02  % Albumin, Urine--  Hematocrit 37.4  Hemoglobin 12.6  CBC  Date: 06-24-19 @ 09:23  Mean cell Pddpsnqrks27.8  Mean cell Hemoglobin Conc34.7  Mean cell Volum 94.4  Platelet count-Automate 141  RBC Count 4.12  Red Cell Distrib Width13.3  WBC Count10.83  % Albumin, Urine--  Hematocrit 38.9  Hemoglobin 13.5    BMP  06-26-19 @ 06:30  Blood Gas Arterial-Calcium,Ionized--  Blood Urea Nitrogen, Serum 16 mg/dL [10 - 20]  Carbon Dioxide, Serum26 mmol/L [17 - 32]  Chloride, Yvizg885 mmol/L [98 - 110]  Creatinie, Serum0.7 mg/dL [0.7 - 1.5]  Glucose, Yvmdi799 mg/dL<H> [70 - 99]  Potassium, Serum3.9 mmol/L [3.5 - 5.0]  Sodium, Serum 140 mmol/L [135 - 146]  [facility]  06-25-19 @ 06:19  Blood Gas Arterial-Calcium,Ionized--  Blood Urea Nitrogen, Serum 17 mg/dL [10 - 20]  Carbon Dioxide, Serum26 mmol/L [17 - 32]  Chloride, Serum99 mmol/L [98 - 110]  Creatinie, Serum0.7 mg/dL [0.7 - 1.5]  Glucose, Serum97 mg/dL [70 - 99]  Potassium, Serum4.0 mmol/L [3.5 - 5.0]  Sodium, Serum 138 mmol/L [135 - 146]  [facility]  06-24-19 @ 09:23  Blood Gas Arterial-Calcium,Ionized--  Blood Urea Nitrogen, Serum 21 mg/dL<H> [10 - 20]  Carbon Dioxide, Serum24 mmol/L [17 - 32]  Chloride, Serum99 mmol/L [98 - 110]  Creatinie, Serum0.8 mg/dL [0.7 - 1.5]  Glucose, Hkrkw231 mg/dL<H> [70 - 99]  Potassium, Serum3.9 mmol/L [3.5 - 5.0]  Sodium, Serum 139 mmol/L [135 - 146]              Microbiology:    Culture - Blood (collected 06-24-19 @ 12:07)  Source: .Blood Blood  Preliminary Report (06-25-19 @ 23:01):    No growth to date.    Culture - Blood (collected 06-24-19 @ 12:07)  Source: .Blood Blood  Preliminary Report (06-25-19 @ 22:01):    No growth to date.        Medications:  acetaminophen   Tablet .. 650 milliGRAM(s) Oral every 6 hours PRN  cefTRIAXone   IVPB 1000 milliGRAM(s) IV Intermittent every 24 hours  cefTRIAXone   IVPB      chlorhexidine 4% Liquid 1 Application(s) Topical <User Schedule>  enoxaparin Injectable 40 milliGRAM(s) SubCutaneous daily  guaiFENesin/dextromethorphan  Syrup 10 milliLiter(s) Oral every 6 hours  lidocaine   Patch 1 Patch Transdermal daily  metroNIDAZOLE    Tablet 500 milliGRAM(s) Oral every 8 hours  morphine  - Injectable 4 milliGRAM(s) IV Push three times a day PRN  oxyCODONE    5 mG/acetaminophen 325 mG 1 Tablet(s) Oral every 6 hours PRN  pantoprazole    Tablet 40 milliGRAM(s) Oral before breakfast  simvastatin 40 milliGRAM(s) Oral at bedtime        Assessment and Plan:  63 yo Puerto Rican speaking M w/ PMH of Dyslipidemia, and 18 pack year smoker (quit 11 years ago) presented with complain  of sustaining back injury after slipping on a wet floor in a hotel bathroom on 6/21, where he hit his left lower back and ribs against a toilet. Denied weakness or light headedness prior to fall. He denied hitting his head or losing consciousness or any neck pain. Additionally complained of chills and productive cough of clear sputum for 3 days with wife having similar symptoms. Trauma w/u showed closed fracture of 10th/11th left sided ribs with RML/RLL infiltrate consistent with aspiration (6/24).    #Acute Aspiration PNA affecting right lung area  - patient responded well to IV Flagyl + Ceftriaxone, upon d/c home patient will be prescribed PO Augmentin  for total of 10 days course, patient was instructed to complete Chest X ray PA and Lateral View in 30 days post d/c home to assess for resolution of pneumonia  - patient also prescribed guaifenesin with dextrometrofan tx. for antitussive tx.  - Incentive spirometry    #Left Rib 10/11 fractures  - Pain c/w tylenol & topical lidocaine tx.    # h/o dyslipidemia  - patient to be continued on daily Simvastatin and low cholesterol diet    Patient verbalized good understanding of discharge instructions and agreed with discharge plan.    d/c plan: patient is medically stable for d/c home today.

## 2019-06-29 LAB
CULTURE RESULTS: SIGNIFICANT CHANGE UP
CULTURE RESULTS: SIGNIFICANT CHANGE UP
SPECIMEN SOURCE: SIGNIFICANT CHANGE UP
SPECIMEN SOURCE: SIGNIFICANT CHANGE UP

## 2019-07-02 DIAGNOSIS — Z87.891 PERSONAL HISTORY OF NICOTINE DEPENDENCE: ICD-10-CM

## 2019-07-02 DIAGNOSIS — R09.02 HYPOXEMIA: ICD-10-CM
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
## 2019-07-02 DIAGNOSIS — Z87.828 PERSONAL HISTORY OF OTHER (HEALED) PHYSICAL INJURY AND TRAUMA: ICD-10-CM
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
## 2019-07-02 DIAGNOSIS — E78.00 PURE HYPERCHOLESTEROLEMIA, UNSPECIFIED: ICD-10-CM

## 2019-07-02 DIAGNOSIS — Z89.021 ACQUIRED ABSENCE OF RIGHT FINGER(S): ICD-10-CM

## 2019-07-02 DIAGNOSIS — M48.56XD COLLAPSED VERTEBRA, NOT ELSEWHERE CLASSIFIED, LUMBAR REGION, SUBSEQUENT ENCOUNTER FOR FRACTURE WITH ROUTINE HEALING: ICD-10-CM

## 2019-07-02 DIAGNOSIS — Z91.81 HISTORY OF FALLING: ICD-10-CM

## 2019-07-02 DIAGNOSIS — S22.42XA MULTIPLE FRACTURES OF RIBS, LEFT SIDE, INITIAL ENCOUNTER FOR CLOSED FRACTURE: ICD-10-CM

## 2019-07-02 DIAGNOSIS — J69.0 PNEUMONITIS DUE TO INHALATION OF FOOD AND VOMIT: ICD-10-CM

## 2019-07-22 ENCOUNTER — OUTPATIENT (OUTPATIENT)
Dept: OUTPATIENT SERVICES | Facility: HOSPITAL | Age: 64
LOS: 1 days | Discharge: HOME | End: 2019-07-22
Payer: MEDICARE

## 2019-07-22 DIAGNOSIS — J18.9 PNEUMONIA, UNSPECIFIED ORGANISM: ICD-10-CM

## 2019-07-22 PROBLEM — E78.00 PURE HYPERCHOLESTEROLEMIA, UNSPECIFIED: Chronic | Status: ACTIVE | Noted: 2019-06-24

## 2019-07-22 PROCEDURE — 71046 X-RAY EXAM CHEST 2 VIEWS: CPT | Mod: 26

## 2019-09-03 NOTE — PROGRESS NOTE ADULT - MINUTES
Prior finding of early nuclear sclerotic changes of lens of both eyes, consistent with age, and early axial posterior subcapsular cataract bilaterally, more apparent in the right eye.     Type 2 diabetes without evidence of diabetic retinopath, and no hypertensive retinopathy, per dilated fundus exam done on previous visit.       Prior diagnosis of glaucoma suspect, secondary to larger-than-average optic nerve head cup-to-disc ratio in both eyes, with asymmetry (greater in the left eye), as noted previously.    No evidence of overtly glaucomatous visual field defect in either eye per HVF test done today.     OD  Slight enlargement of blind spot, and focal paracentral visual field defect in association with shallow depression.     OS  Normal visual field  Intraocular pressure within normal range in both eyes, although high-normal in each eye today.  Discussed with Ms. Delacruz.  No compelling reason to initiate treatment for IOP control/reduction in either eye.  Continue to monitor.  Return for recheck in six months, with repeat HVF test (24-2) at that time.   Orders for future HVF test entered,.      38

## 2019-09-12 NOTE — H&P ADULT - NSHPLANGTRANSLATORFT_GEN_A_CORE
RN

## 2020-09-14 ENCOUNTER — OUTPATIENT (OUTPATIENT)
Dept: OUTPATIENT SERVICES | Facility: HOSPITAL | Age: 65
LOS: 1 days | Discharge: HOME | End: 2020-09-14

## 2020-09-14 DIAGNOSIS — Z11.59 ENCOUNTER FOR SCREENING FOR OTHER VIRAL DISEASES: ICD-10-CM

## 2020-09-17 ENCOUNTER — OUTPATIENT (OUTPATIENT)
Dept: OUTPATIENT SERVICES | Facility: HOSPITAL | Age: 65
LOS: 1 days | Discharge: HOME | End: 2020-09-17

## 2020-09-17 VITALS
TEMPERATURE: 98 F | SYSTOLIC BLOOD PRESSURE: 158 MMHG | RESPIRATION RATE: 17 BRPM | HEIGHT: 66 IN | WEIGHT: 164.02 LBS | HEART RATE: 71 BPM | DIASTOLIC BLOOD PRESSURE: 73 MMHG | OXYGEN SATURATION: 99 %

## 2020-09-17 VITALS — SYSTOLIC BLOOD PRESSURE: 140 MMHG | HEART RATE: 71 BPM | DIASTOLIC BLOOD PRESSURE: 83 MMHG

## 2020-09-17 NOTE — ASU PATIENT PROFILE, ADULT - PSH
Abdominal hernia     Abdominal hernia    Amputation finger  partial amputation right pinky/ left 2nd digit

## 2020-09-17 NOTE — ASU PATIENT PROFILE, ADULT - LANGUAGE ASSISTANCE NEEDED
Yes-Patient/Caregiver accepts free interpretation services... angie id# [id number]/Yes-Patient/Caregiver accepts free interpretation services...

## 2020-09-17 NOTE — ASU PATIENT PROFILE, ADULT - NS TRANSFER PATIENT BELONGINGS
Clothing/Jewelry/Money (specify)/locker9  has ring Clothing/Jewelry/Money (specify)/locker9 / has ring

## 2020-09-21 DIAGNOSIS — H25.12 AGE-RELATED NUCLEAR CATARACT, LEFT EYE: ICD-10-CM

## 2020-09-21 DIAGNOSIS — H21.562 PUPILLARY ABNORMALITY, LEFT EYE: ICD-10-CM

## 2020-09-21 DIAGNOSIS — E78.00 PURE HYPERCHOLESTEROLEMIA, UNSPECIFIED: ICD-10-CM

## 2020-10-19 ENCOUNTER — OUTPATIENT (OUTPATIENT)
Dept: OUTPATIENT SERVICES | Facility: HOSPITAL | Age: 65
LOS: 1 days | Discharge: HOME | End: 2020-10-19

## 2020-10-19 DIAGNOSIS — Z11.59 ENCOUNTER FOR SCREENING FOR OTHER VIRAL DISEASES: ICD-10-CM

## 2020-10-21 NOTE — ASU PATIENT PROFILE, ADULT - ABILITY TO HEAR (WITH HEARING AID OR HEARING APPLIANCE IF NORMALLY USED):
Adequate: hears normal conversation without difficulty Mildly to Moderately Impaired: difficulty hearing in some environments or speaker may need to increase volume or speak distinctly/uses left hearing aid

## 2020-10-22 ENCOUNTER — OUTPATIENT (OUTPATIENT)
Dept: OUTPATIENT SERVICES | Facility: HOSPITAL | Age: 65
LOS: 1 days | Discharge: HOME | End: 2020-10-22

## 2020-10-22 VITALS — DIASTOLIC BLOOD PRESSURE: 80 MMHG | HEART RATE: 74 BPM | RESPIRATION RATE: 18 BRPM | SYSTOLIC BLOOD PRESSURE: 122 MMHG

## 2020-10-22 VITALS
HEIGHT: 66 IN | SYSTOLIC BLOOD PRESSURE: 138 MMHG | OXYGEN SATURATION: 100 % | HEART RATE: 78 BPM | RESPIRATION RATE: 20 BRPM | TEMPERATURE: 98 F | DIASTOLIC BLOOD PRESSURE: 88 MMHG | WEIGHT: 167.99 LBS

## 2020-10-27 DIAGNOSIS — H25.12 AGE-RELATED NUCLEAR CATARACT, LEFT EYE: ICD-10-CM
